# Patient Record
Sex: MALE | Race: WHITE | NOT HISPANIC OR LATINO | URBAN - METROPOLITAN AREA
[De-identification: names, ages, dates, MRNs, and addresses within clinical notes are randomized per-mention and may not be internally consistent; named-entity substitution may affect disease eponyms.]

---

## 2017-07-01 ENCOUNTER — EMERGENCY (EMERGENCY)
Facility: HOSPITAL | Age: 77
LOS: 1 days | Discharge: HOME | End: 2017-07-01
Attending: EMERGENCY MEDICINE | Admitting: EMERGENCY MEDICINE
Payer: MEDICARE

## 2017-07-01 VITALS
DIASTOLIC BLOOD PRESSURE: 81 MMHG | SYSTOLIC BLOOD PRESSURE: 150 MMHG | TEMPERATURE: 98 F | RESPIRATION RATE: 20 BRPM | OXYGEN SATURATION: 96 % | HEART RATE: 79 BPM

## 2017-07-01 PROCEDURE — 29515 APPLICATION SHORT LEG SPLINT: CPT | Mod: LT

## 2017-07-01 PROCEDURE — 73610 X-RAY EXAM OF ANKLE: CPT | Mod: 26,LT

## 2017-07-01 PROCEDURE — 73630 X-RAY EXAM OF FOOT: CPT | Mod: 26,LT

## 2017-07-01 PROCEDURE — 73630 X-RAY EXAM OF FOOT: CPT

## 2017-07-01 PROCEDURE — 99283 EMERGENCY DEPT VISIT LOW MDM: CPT | Mod: 25

## 2017-07-01 PROCEDURE — 73610 X-RAY EXAM OF ANKLE: CPT

## 2017-07-01 PROCEDURE — 99284 EMERGENCY DEPT VISIT MOD MDM: CPT | Mod: 25

## 2017-07-01 PROCEDURE — 29515 APPLICATION SHORT LEG SPLINT: CPT | Mod: RT

## 2017-07-01 RX ORDER — IBUPROFEN 200 MG
1 TABLET ORAL
Qty: 28 | Refills: 0 | OUTPATIENT
Start: 2017-07-01 | End: 2017-07-08

## 2017-07-01 RX ORDER — OXYCODONE HYDROCHLORIDE 5 MG/1
5 TABLET ORAL ONCE
Qty: 0 | Refills: 0 | Status: DISCONTINUED | OUTPATIENT
Start: 2017-07-01 | End: 2017-07-01

## 2017-07-01 RX ORDER — ASPIRIN/CALCIUM CARB/MAGNESIUM 324 MG
0 TABLET ORAL
Qty: 0 | Refills: 0 | COMMUNITY

## 2017-07-01 RX ORDER — IBUPROFEN 200 MG
0 TABLET ORAL
Qty: 0 | Refills: 0 | COMMUNITY

## 2017-07-01 RX ORDER — OXYCODONE HYDROCHLORIDE 5 MG/1
1 TABLET ORAL
Qty: 20 | Refills: 0 | OUTPATIENT
Start: 2017-07-01 | End: 2017-07-06

## 2017-07-01 RX ORDER — IBUPROFEN 200 MG
1 TABLET ORAL
Qty: 21 | Refills: 0 | OUTPATIENT
Start: 2017-07-01 | End: 2017-07-08

## 2017-07-01 RX ORDER — IBUPROFEN 200 MG
600 TABLET ORAL ONCE
Qty: 0 | Refills: 0 | Status: COMPLETED | OUTPATIENT
Start: 2017-07-01 | End: 2017-07-01

## 2017-07-01 RX ADMIN — Medication 600 MILLIGRAM(S): at 17:03

## 2017-07-01 RX ADMIN — OXYCODONE HYDROCHLORIDE 5 MILLIGRAM(S): 5 TABLET ORAL at 17:47

## 2017-07-01 RX ADMIN — OXYCODONE HYDROCHLORIDE 5 MILLIGRAM(S): 5 TABLET ORAL at 17:02

## 2017-07-01 RX ADMIN — Medication 600 MILLIGRAM(S): at 17:47

## 2017-07-01 NOTE — ED PROVIDER NOTE - ATTENDING CONTRIBUTION TO CARE
pt is a 77 y/o with r knee pain last week from [place], no trauma, mri performed this past tue with acl and mcl sprain, but then fell when he picked up the results with ankle and foot pain and swelling noted, plain films ordered r/o fx.

## 2017-07-01 NOTE — ED PROCEDURE NOTE - NS ED PERI VASCULAR NEG
fingers/toes warm to touch/capillary refill time < 2 seconds/no cyanosis of extremity/no paresthesia

## 2017-07-01 NOTE — ED PROVIDER NOTE - PHYSICAL EXAMINATION
NAD, VSS, Afebrile, No spinal tender, + left knee generalized tender, swelling, diminished ROMs, + left ankle/ foot generalized swelling, tender, warmth, diminished ROMs, no cellulitis, + left great toe swelling with tender, N/V- intact.

## 2017-07-01 NOTE — ED PROVIDER NOTE - OBJECTIVE STATEMENT
75yo male pt c/o left ankle/ foot pain with swelling after twist injury 3days ago. Pt stated he had knee injury s/p fall last week (ACL/MCL sprain or partial tear in MRI) and on crutches. Denies head injury or neck/ back pain. Denies CP/SOB/ABD pain or N/V. Denies CP/SOB/ABD pain or pelvic/ hip pain. Denies sensory changes or weakness to extremities. 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

## 2017-07-01 NOTE — ED ADULT NURSE NOTE - OBJECTIVE STATEMENT
75 yo M came to ED c/o knee and ankle swelling and pain.  A&Ox4.  6/21, pt had torn meniscus, dx by MRI.  on 6/27 while using a leg brace and crutches, pt slipped and twisted the left ankle and since then the ankle has been swollen and he has not been able to bear any weight.  On exam, there was visible swelling to the left ankle and knee, tenderness on palpation, and pain with no stimulation.  Pt describes the pain as constant throbbing and sharp pain, 7/10 at rest and 10/10 when trying to bear weight and move.  Pt states that left leg and ankle becomes stiff and difficult to move when sitting in one position too long.  Pt resting with stretcher in the lowest position, with family member at bedside.

## 2017-12-23 NOTE — ED PROCEDURE NOTE - CPROC ED POST PROC CARE GUIDE1
Instructed patient/caregiver regarding signs and symptoms of infection./Verbal/written post procedure instructions were given to patient/caregiver./Instructed patient/caregiver to follow-up with primary care physician./Keep the cast/splint/dressing clean and dry./Elevate the injured extremity as instructed. AIDS SEVERINO (acute kidney injury) Nonintractable epilepsy without status epilepticus, unspecified epilepsy type Pancytopenia Polysubstance abuse Weight loss
